# Patient Record
Sex: FEMALE | Race: WHITE | NOT HISPANIC OR LATINO | URBAN - METROPOLITAN AREA
[De-identification: names, ages, dates, MRNs, and addresses within clinical notes are randomized per-mention and may not be internally consistent; named-entity substitution may affect disease eponyms.]

---

## 2022-10-17 ENCOUNTER — APPOINTMENT (RX ONLY)
Dept: URBAN - METROPOLITAN AREA CLINIC 323 | Facility: CLINIC | Age: 37
Setting detail: DERMATOLOGY
End: 2022-10-17

## 2022-10-17 DIAGNOSIS — B07.8 OTHER VIRAL WARTS: ICD-10-CM

## 2022-10-17 PROCEDURE — ? FULL BODY SKIN EXAM - DECLINED

## 2022-10-17 PROCEDURE — ? TREATMENT REGIMEN

## 2022-10-17 PROCEDURE — ? LIQUID NITROGEN

## 2022-10-17 PROCEDURE — ? PRESCRIPTION

## 2022-10-17 PROCEDURE — ? COUNSELING

## 2022-10-17 PROCEDURE — 17110 DESTRUCTION B9 LES UP TO 14: CPT

## 2022-10-17 RX ORDER — PHARMACY COMPOUNDING ACCESSORY
EACH MISCELLANEOUS
Qty: 60 | Refills: 2 | Status: ERX | COMMUNITY
Start: 2022-10-17

## 2022-10-17 RX ADMIN — Medication: at 00:00

## 2022-10-17 ASSESSMENT — LOCATION DETAILED DESCRIPTION DERM: LOCATION DETAILED: LEFT PLANTAR FOREFOOT OVERLYING 3RD METATARSAL

## 2022-10-17 ASSESSMENT — LOCATION ZONE DERM: LOCATION ZONE: FEET

## 2022-10-17 ASSESSMENT — LOCATION SIMPLE DESCRIPTION DERM: LOCATION SIMPLE: LEFT PLANTAR SURFACE

## 2022-10-17 NOTE — PROCEDURE: LIQUID NITROGEN
Spray Paint Technique: No
Show Applicator Variable?: Yes
Spray Paint Text: The liquid nitrogen was applied to the skin utilizing a spray paint frosting technique.
Medical Necessity Information: It is in your best interest to select a reason for this procedure from the list below. All of these items fulfill various CMS LCD requirements except the new and changing color options.
Medical Necessity Clause: This procedure was medically necessary because the lesions that were treated were:
Detail Level: Detailed
Consent: The patient's consent was obtained including but not limited to risks of crusting, scabbing, blistering, scarring, darker or lighter pigmentary change, recurrence, incomplete removal and infection.
Post-Care Instructions: I reviewed with the patient in detail post-care instructions. Patient is to wear sunprotection, and avoid picking at any of the treated lesions. Pt may apply Vaseline to crusted or scabbing areas.

## 2022-10-25 ENCOUNTER — RX ONLY (OUTPATIENT)
Age: 37
Setting detail: RX ONLY
End: 2022-10-25

## 2022-10-25 RX ORDER — MUPIROCIN 20 MG/G
OINTMENT TOPICAL
Qty: 22 | Refills: 1 | Status: ERX | COMMUNITY
Start: 2022-10-25

## 2022-11-22 ENCOUNTER — APPOINTMENT (RX ONLY)
Dept: URBAN - METROPOLITAN AREA CLINIC 323 | Facility: CLINIC | Age: 37
Setting detail: DERMATOLOGY
End: 2022-11-22

## 2022-11-22 DIAGNOSIS — B07.8 OTHER VIRAL WARTS: ICD-10-CM

## 2022-11-22 PROCEDURE — ? FULL BODY SKIN EXAM - DECLINED

## 2022-11-22 PROCEDURE — ? LIQUID NITROGEN

## 2022-11-22 PROCEDURE — ? COUNSELING

## 2022-11-22 PROCEDURE — 17110 DESTRUCTION B9 LES UP TO 14: CPT

## 2022-11-22 PROCEDURE — ? TREATMENT REGIMEN

## 2022-11-22 ASSESSMENT — LOCATION SIMPLE DESCRIPTION DERM: LOCATION SIMPLE: LEFT PLANTAR SURFACE

## 2022-11-22 ASSESSMENT — LOCATION DETAILED DESCRIPTION DERM: LOCATION DETAILED: LEFT PLANTAR FOREFOOT OVERLYING 3RD METATARSAL

## 2022-11-22 ASSESSMENT — LOCATION ZONE DERM: LOCATION ZONE: FEET

## 2022-12-23 ENCOUNTER — APPOINTMENT (RX ONLY)
Dept: URBAN - METROPOLITAN AREA CLINIC 323 | Facility: CLINIC | Age: 37
Setting detail: DERMATOLOGY
End: 2022-12-23

## 2022-12-23 DIAGNOSIS — B07.8 OTHER VIRAL WARTS: ICD-10-CM

## 2022-12-23 PROCEDURE — 17110 DESTRUCTION B9 LES UP TO 14: CPT

## 2022-12-23 PROCEDURE — ? COUNSELING

## 2022-12-23 PROCEDURE — ? TREATMENT REGIMEN

## 2022-12-23 PROCEDURE — ? LIQUID NITROGEN

## 2022-12-23 PROCEDURE — ? FULL BODY SKIN EXAM - DECLINED

## 2022-12-23 ASSESSMENT — LOCATION SIMPLE DESCRIPTION DERM: LOCATION SIMPLE: LEFT PLANTAR SURFACE

## 2022-12-23 ASSESSMENT — LOCATION ZONE DERM: LOCATION ZONE: FEET

## 2022-12-23 ASSESSMENT — LOCATION DETAILED DESCRIPTION DERM: LOCATION DETAILED: LEFT PLANTAR FOREFOOT OVERLYING 3RD METATARSAL
